# Patient Record
Sex: MALE | Race: AMERICAN INDIAN OR ALASKA NATIVE
[De-identification: names, ages, dates, MRNs, and addresses within clinical notes are randomized per-mention and may not be internally consistent; named-entity substitution may affect disease eponyms.]

---

## 2018-09-04 ENCOUNTER — HOSPITAL ENCOUNTER (OUTPATIENT)
Dept: HOSPITAL 5 - OR | Age: 53
Discharge: HOME | End: 2018-09-04
Attending: UROLOGY
Payer: COMMERCIAL

## 2018-09-04 VITALS — SYSTOLIC BLOOD PRESSURE: 130 MMHG | DIASTOLIC BLOOD PRESSURE: 90 MMHG

## 2018-09-04 DIAGNOSIS — G47.30: ICD-10-CM

## 2018-09-04 DIAGNOSIS — E11.9: ICD-10-CM

## 2018-09-04 DIAGNOSIS — Z87.440: ICD-10-CM

## 2018-09-04 DIAGNOSIS — I10: ICD-10-CM

## 2018-09-04 DIAGNOSIS — M19.90: ICD-10-CM

## 2018-09-04 DIAGNOSIS — N40.0: ICD-10-CM

## 2018-09-04 DIAGNOSIS — N30.21: Primary | ICD-10-CM

## 2018-09-04 DIAGNOSIS — J45.909: ICD-10-CM

## 2018-09-04 DIAGNOSIS — Z79.899: ICD-10-CM

## 2018-09-04 DIAGNOSIS — Z79.84: ICD-10-CM

## 2018-09-04 DIAGNOSIS — Z85.51: ICD-10-CM

## 2018-09-04 DIAGNOSIS — Z87.442: ICD-10-CM

## 2018-09-04 DIAGNOSIS — K21.9: ICD-10-CM

## 2018-09-04 PROCEDURE — 52204 CYSTOSCOPY W/BIOPSY(S): CPT

## 2018-09-04 PROCEDURE — 88112 CYTOPATH CELL ENHANCE TECH: CPT

## 2018-09-04 PROCEDURE — C1758 CATHETER, URETERAL: HCPCS

## 2018-09-04 PROCEDURE — 93010 ELECTROCARDIOGRAM REPORT: CPT

## 2018-09-04 PROCEDURE — 88305 TISSUE EXAM BY PATHOLOGIST: CPT

## 2018-09-04 PROCEDURE — 82962 GLUCOSE BLOOD TEST: CPT

## 2018-09-04 PROCEDURE — 93005 ELECTROCARDIOGRAM TRACING: CPT

## 2018-09-04 PROCEDURE — 74420 UROGRAPHY RTRGR +-KUB: CPT

## 2018-09-04 NOTE — DISCHARGE SUMMARY
Short Stay Discharge Plan


Activity: other (no straining )


Weight Bearing Status: Full Weight Bearing


Diet: regular


Special Instructions: other (inc fluids )


Durable Medical Equipment Needed Upon Discharge: other


Follow up with: 


FELICITAS CAZARES MD [Primary Care Provider] - 7 Days


ERIC LANDERS MD [Staff Physician] - 14 Days

## 2018-09-04 NOTE — POST OPERATIVE NOTE
Date of procedure: 09/04/18


Pre-op diagnosis: gross hematuria 


Post-op diagnosis: same


Findings: 





bph


Procedure: 





cysto bx rpgs


Anesthesia: GETA


Estimated blood loss: none


Pathology: list (bladder)


Specimen disposition: to lab


Condition: stable


Disposition: PACU

## 2018-09-04 NOTE — ANESTHESIA DAY OF SURGERY
Anesthesia Day of Surgery





- Day of Surgery


Patient Examined: Yes


Patient H&P Reviewed: Yes


Patient is NPO: Yes


Beta Blockers: No

## 2018-09-04 NOTE — OPERATIVE REPORT
PREOPERATIVE DIAGNOSIS:  Hematuria.



POSTOPERATIVE DIAGNOSIS:  Prostatic enlargement.  No acute findings.



PROCEDURE:  Cystoscopy, bilateral retrograde pyelography, bladder biopsy,

fulguration.



SURGEON:  Jameson Gill MD



ANESTHESIA:  General.



FINDINGS:  The gentleman with hematuria.  He had clots.  CT scan was

unremarkable.  Now presents for cystoscopy.



DESCRIPTION OF PROCEDURE:  The patient brought to the operating room and placed

on the operating table.  Following induction of anesthesia, placed in lithotomy

position, prepped and draped in usual sterile fashion.  Cystourethroscopy showed

no bladder lesions.  Prostate was enlarged with mostly bilobar hypertrophy. 

There were no bladder lesions.  The orifices were tucked behind the median bar,

nothing real big and retrogrades were easily done.  There were no persistent

filling defects.  There was few air bubbles left side.  The patient tolerated

the procedure well.  Bladder biopsy random was carried out.  Area was

cauterized.  No Barnard was left, brought to recovery in stable condition.





DD: 09/04/2018 14:24

DT: 09/04/2018 15:06

JOB# [id number]  [id number]

SHAWANDA/AMELIE

## 2018-09-04 NOTE — ANESTHESIA CONSULTATION
Anesthesia Consult and Med Hx


Date of service: 09/04/18





- Airway


Anesthetic Teeth Evaluation: Good


ROM Head & Neck: Adequate


Mental/Hyoid Distance: Adequate


Mallampati Class: Class III


Intubation Access Assessment: Possibly Difficult





- Pulmonary Exam


CTA: Yes





- Cardiac Exam


Cardiac Exam: RRR





- Pre-Operative Health Status


ASA Pre-Surgery Classification: ASA3


Proposed Anesthetic Plan: General





- Pulmonary


Hx Asthma: Yes


Hx Sleep Apnea: Yes





- Cardiovascular System


Hx Hypertension: Yes





- Central Nervous System


Hx Back Pain: Yes





- Gastrointestinal


Hx Gastroesophageal Reflux Disease: Yes





- Additional Comments


Anesthesia Medical History Comments: patient with hx of occassional chest 

discomfort while at rest; no baseline EKG; will obtain

## 2018-09-05 NOTE — FLUOROSCOPY REPORT
FLUOROSCOPY RETROGRADE UROGRAPHY:



HISTORY: Hematuria.



FINDINGS: Fluoroscopy was provided by radiology during retrograde 

urography by the urologist. 5 fluoroscopic images were captured.



There is adequate filling of the ureters and intrarenal collecting 

systems with no filling defects or anatomic abnormalities identified. 

Please correlate with the procedural report if needed.



IMPRESSION: Retrograde pyelograms within normal limits.

## 2018-09-05 NOTE — POST ANESTHESIA EVALUATION
- Post Anesthesia Evaluation


Patient Participated: Yes


Airway Patent: Yes


Stable Respiratory Function: Yes


Nausea/Vomiting: No


Temp > 96.8F: No


Pain Manageable: Yes


Adequeate Hydration: Yes


Anesthesia Complications: No